# Patient Record
Sex: MALE | Race: WHITE | NOT HISPANIC OR LATINO | Employment: OTHER | URBAN - METROPOLITAN AREA
[De-identification: names, ages, dates, MRNs, and addresses within clinical notes are randomized per-mention and may not be internally consistent; named-entity substitution may affect disease eponyms.]

---

## 2017-04-08 ENCOUNTER — HOSPITAL ENCOUNTER (EMERGENCY)
Facility: MEDICAL CENTER | Age: 22
End: 2017-04-08
Attending: EMERGENCY MEDICINE
Payer: COMMERCIAL

## 2017-04-08 VITALS
TEMPERATURE: 98.9 F | DIASTOLIC BLOOD PRESSURE: 74 MMHG | SYSTOLIC BLOOD PRESSURE: 141 MMHG | WEIGHT: 145.5 LBS | RESPIRATION RATE: 18 BRPM | BODY MASS INDEX: 22.84 KG/M2 | OXYGEN SATURATION: 96 % | HEART RATE: 75 BPM | HEIGHT: 67 IN

## 2017-04-08 DIAGNOSIS — S01.81XA FACIAL LACERATION, INITIAL ENCOUNTER: ICD-10-CM

## 2017-04-08 PROCEDURE — 700101 HCHG RX REV CODE 250: Performed by: EMERGENCY MEDICINE

## 2017-04-08 PROCEDURE — 303747 HCHG EXTRA SUTURE

## 2017-04-08 PROCEDURE — 99283 EMERGENCY DEPT VISIT LOW MDM: CPT

## 2017-04-08 PROCEDURE — 304217 HCHG IRRIGATION SYSTEM

## 2017-04-08 PROCEDURE — 304999 HCHG REPAIR-SIMPLE/INTERMED LEVEL 1

## 2017-04-08 RX ORDER — LIDOCAINE HYDROCHLORIDE AND EPINEPHRINE BITARTRATE 20; .01 MG/ML; MG/ML
10 INJECTION, SOLUTION SUBCUTANEOUS ONCE
Status: COMPLETED | OUTPATIENT
Start: 2017-04-08 | End: 2017-04-08

## 2017-04-08 RX ADMIN — LIDOCAINE HYDROCHLORIDE,EPINEPHRINE BITARTRATE 10 ML: 20; .01 INJECTION, SOLUTION INFILTRATION; PERINEURAL at 20:45

## 2017-04-08 ASSESSMENT — PAIN SCALES - GENERAL
PAINLEVEL_OUTOF10: 3
PAINLEVEL_OUTOF10: 3

## 2017-04-08 NOTE — ED AVS SNAPSHOT
Home Care Instructions                                                                                                                Edward Vega   MRN: [id number]    Department:  [facility], Emergency Dept   Date of Visit:  4/8/2017            [facility], Emergency Dept    [address]    [place] [address]    Phone:  [phone]      You were seen by     Alan Hernandez M.D.      Your Diagnosis Was     Facial laceration, initial encounter     S01.81XA       These are the medications you received during your hospitalization from 04/08/2017 2004 to 04/08/2017 2121     Date/Time Order Dose Route Action    04/08/2017 2045 lidocaine-epinephrine 2 %-1:732370 injection 10 mL 10 mL Injection Given      Follow-up Information     1. Follow up with Nadine Higuera M.D..    Specialty:  Family Medicine    Contact information    [address] [facility] [address]  [place] [address] [phone]        Medication Information     Review all of your home medications and newly ordered medications with your primary doctor and/or pharmacist as soon as possible. Follow medication instructions as directed by your doctor and/or pharmacist.     Please keep your complete medication list with you and share with your physician. Update the information when medications are discontinued, doses are changed, or new medications (including over-the-counter products) are added; and carry medication information at all times in the event of emergency situations.               Medication List      Notice     You have not been prescribed any medications.              Discharge Instructions       Facial Laceration   A facial laceration is a cut on the face. These injuries can be painful and cause bleeding. Lacerations usually heal quickly, but they need special care to reduce scarring.  DIAGNOSIS   Your health care provider will take a medical history, ask for details about how the injury occurred, and  examine the wound to determine how deep the cut is.  TREATMENT   Some facial lacerations may not require closure. Others may not be able to be closed because of an increased risk of infection. The risk of infection and the chance for successful closure will depend on various factors, including the amount of time since the injury occurred.  The wound may be cleaned to help prevent infection. If closure is appropriate, pain medicines may be given if needed. Your health care provider will use stitches (sutures), wound glue (adhesive), or skin adhesive strips to repair the laceration. These tools bring the skin edges together to allow for faster healing and a better cosmetic outcome. If needed, you may also be given a tetanus shot.  HOME CARE INSTRUCTIONS  · Only take over-the-counter or prescription medicines as directed by your health care provider.  · Follow your health care provider's instructions for wound care. These instructions will vary depending on the technique used for closing the wound.  For Sutures:  · Keep the wound clean and dry.    · If you were given a bandage (dressing), you should change it at least once a day. Also change the dressing if it becomes wet or dirty, or as directed by your health care provider.    · Wash the wound with soap and water 2 times a day. Rinse the wound off with water to remove all soap. Pat the wound dry with a clean towel.    · After cleaning, apply a thin layer of the antibiotic ointment recommended by your health care provider. This will help prevent infection and keep the dressing from sticking.    · You may shower as usual after the first 24 hours. Do not soak the wound in water until the sutures are removed.    · Get your sutures removed as directed by your health care provider. With facial lacerations, sutures should usually be taken out after 4-5 days to avoid stitch marks.    · Wait a few days after your sutures are removed before applying any makeup.  For Skin  Adhesive Strips:  · Keep the wound clean and dry.    · Do not get the skin adhesive strips wet. You may bathe carefully, using caution to keep the wound dry.    · If the wound gets wet, pat it dry with a clean towel.    · Skin adhesive strips will fall off on their own. You may trim the strips as the wound heals. Do not remove skin adhesive strips that are still stuck to the wound. They will fall off in time.    For Wound Adhesive:  · You may briefly wet your wound in the shower or bath. Do not soak or scrub the wound. Do not swim. Avoid periods of heavy sweating until the skin adhesive has fallen off on its own. After showering or bathing, gently pat the wound dry with a clean towel.    · Do not apply liquid medicine, cream medicine, ointment medicine, or makeup to your wound while the skin adhesive is in place. This may loosen the film before your wound is healed.    · If a dressing is placed over the wound, be careful not to apply tape directly over the skin adhesive. This may cause the adhesive to be pulled off before the wound is healed.    · Avoid prolonged exposure to sunlight or tanning lamps while the skin adhesive is in place.  · The skin adhesive will usually remain in place for 5-10 days, then naturally fall off the skin. Do not pick at the adhesive film.    After Healing:  Once the wound has healed, cover the wound with sunscreen during the day for 1 full year. This can help minimize scarring. Exposure to ultraviolet light in the first year will darken the scar. It can take 1-2 years for the scar to lose its redness and to heal completely.   SEEK MEDICAL CARE IF:  · You have a fever.  SEEK IMMEDIATE MEDICAL CARE IF:  · You have redness, pain, or swelling around the wound.    · You see a yellowish-white fluid (pus) coming from the wound.       This information is not intended to replace advice given to you by your health care provider. Make sure you discuss any questions you have with your health care  provider.     Document Released: 01/25/2006 Document Revised: 01/08/2016 Document Reviewed: 07/31/2014  Elsevier Interactive Patient Education ©2016 Elsevier Inc.            Patient Information     Patient Information    Following emergency treatment: all patient requiring follow-up care must return either to a private physician or a clinic if your condition worsens before you are able to obtain further medical attention, please return to the emergency room.     Billing Information    At [facility], we work to make the billing process streamlined for our patients.  Our Representatives are here to answer any questions you may have regarding your hospital bill.  If you have insurance coverage and have supplied your insurance information to us, we will submit a claim to your insurer on your behalf.  Should you have any questions regarding your bill, we can be reached online or by phone as follows:  Online: You are able pay your bills online or live chat with our representatives about any billing questions you may have. We are here to help Monday - Friday from 8:00am to 7:30pm and 9:00am - 12:00pm on Saturdays.  Please visit [email or web address]  for more information.   Phone:  [phone] or [phone]    Please note that your emergency physician, surgeon, pathologist, radiologist, anesthesiologist, and other specialists are not employed by [facility] and will therefore bill separately for their services.  Please contact them directly for any questions concerning their bills at the numbers below:     Emergency Physician Services:  [phone]  [facility]:  [phone]  [facility]:  [phone]  [facility]:  [phone]    1. Your final bill may vary from the amount quoted upon discharge if all procedures are not complete at that time, or if your doctor has additional procedures of which we are not aware. You will receive an  additional bill if you return to the Emergency Department at [facility] for suture removal regardless of the facility of which the sutures were placed.     2. Please arrange for settlement of this account at the emergency registration.    3. All self-pay accounts are due in full at the time of treatment.  If you are unable to meet this obligation then payment is expected within 4-5 days.     4. If you have had radiology studies (CT, X-ray, Ultrasound, MRI), you have received a preliminary result during your emergency department visit. Please contact the radiology department [phone] to receive a copy of your final result. Please discuss the Final result with your primary physician or with the follow up physician provided.     Crisis Hotline:  [place] Crisis Hotline:  7-861-UXQXJJE or 1-853.218.4553  [place] Crisis Hotline:    1-382.885.7321 or 377-157-1424         ED Discharge Follow Up Questions    1. In order to provide you with very good care, we would like to follow up with a phone call in the next few days.  May we have your permission to contact you?     YES /  NO    2. What is the best phone number to call you? (       )_____-__________    3. What is the best time to call you?      Morning  /  Afternoon  /  Evening                   Patient Signature:  ____________________________________________________________    Date:  ____________________________________________________________

## 2017-04-08 NOTE — ED AVS SNAPSHOT
[employer] Access Code: [email or web address]  Expires: 5/8/2017  9:21 PM    [employer]  A secure, online tool to manage your health information     [employer]® is a secure, online tool that connects you to your personalized health information from the privacy of your home -- day or night - making it very easy for you to manage your healthcare. Once the activation process is completed, you can even access your medical information using the [employer] eleni, which is available for free in the Apple Eleni store or Google Play store.     [employer] provides the following levels of access (as shown below):   My Chart Features   [facility] Primary Care Doctor [facility]  Specialists [facility]  Urgent  Care Non-[facility]  Primary Care  Doctor   Email your healthcare team securely and privately 24/7 X X X X   Manage appointments: schedule your next appointment; view details of past/upcoming appointments X      Request prescription refills. X      View recent personal medical records, including lab and immunizations X X X X   View health record, including health history, allergies, medications X X X X   Read reports about your outpatient visits, procedures, consult and ER notes X X X X   See your discharge summary, which is a recap of your hospital and/or ER visit that includes your diagnosis, lab results, and care plan. X X       How to register for [employer]:  1. Go to  [email or web address].  2. Click on the Sign Up Now box, which takes you to the New Member Sign Up page. You will need to provide the following information:  a. Enter your [employer] Access Code exactly as it appears at the top of this page. (You will not need to use this code after you’ve completed the sign-up process. If you do not sign up before the expiration date, you must request a new code.)   b. Enter your date of birth.   c. Enter your home email address.   d. Click Submit, and follow the next screen’s instructions.  3. Create a [employer] ID. This will be your [employer]  login ID and cannot be changed, so think of one that is secure and easy to remember.  4. Create a [employer] password. You can change your password at any time.  5. Enter your Password Reset Question and Answer. This can be used at a later time if you forget your password.   6. Enter your e-mail address. This allows you to receive e-mail notifications when new information is available in [employer].  7. Click Sign Up. You can now view your health information.    For assistance activating your [employer] account, call [phone]

## 2017-04-08 NOTE — ED AVS SNAPSHOT
4/8/2017          Edward Vega  [address] [place] [address]    Dear Edward:    [facility] wants to ensure your discharge home is safe and you or your loved ones have had all your questions answered regarding your care after you leave the hospital.    You may receive a telephone call within two days of your discharge.  This call is to make certain you understand your discharge instructions as well as ensure we provided you with the best care possible during your stay with us.     The call will only last approximately 3-5 minutes and will be done by a nurse.    Once again, we want to ensure your discharge home is safe and that you have a clear understanding of any next steps in your care.  If you have any questions or concerns, please do not hesitate to contact us, we are here for you.  Thank you for choosing [facility] for your healthcare needs.    Sincerely,    Chuck Longo    [facility]

## 2017-04-09 NOTE — ED NOTES
"Pt assessed. Pt comes in with right sided lac above eyebrow after being hit with a ping pong paddle that was thrown because \"my friend was mad he lost\". Incident occurred about 20 minutes ago. Pt states that he \"fell to the ground and blacked out for a few seconds but I dont think I passed out because I still could hear everything.\" Pt has not had tetanus vaccine recently, per Mother, he does not get vaccinations because he has severe allergies to multiple foods/environmental. Saline soaked gauze applied to lac. Will cont to monitor.   "

## 2017-04-09 NOTE — ED PROVIDER NOTES
"ED Provider Note    CHIEF COMPLAINT  Chief Complaint   Patient presents with   • Facial Laceration       HPI  Edward Vega is a 22 y.o. male who presents with a facial laceration starting just above the right eyebrow extending down into it. This occurred just shortly before arrival he was playing ping-pong apparently another player lost his pedal and it flew forward and hit him on the eyebrow there is no additional trauma no injury to the eye itself    REVIEW OF SYSTEMS  See HPI for further details. PAST MEDICAL HISTORY  No past medical history on file.    FAMILY HISTORY  No family history on file.    SOCIAL HISTORY  Social History     Social History   • Marital Status: Single     Spouse Name: N/A   • Number of Children: N/A   • Years of Education: N/A     Social History Main Topics   • Smoking status: Never Smoker    • Smokeless tobacco: Never Used   • Alcohol Use: Not on file   • Drug Use: Not on file   • Sexual Activity: Not on file     Other Topics Concern   • Not on file     Social History Narrative   • No narrative on file       SURGICAL HISTORY  No past surgical history on file.    CURRENT MEDICATIONS  Home Medications     Reviewed by Tad Garcia R.N. (Registered Nurse) on 04/08/17 at 2014  Med List Status: Complete    Medication Last Dose Status          Patient Mason Taking any Medications                        ALLERGIES  Allergies   Allergen Reactions   • Azithromycin      Does not know what happens when he takes it   • Dairy Enzyme Formula    • Peanut-Derived        PHYSICAL EXAM  VITAL SIGNS: /74 mmHg  Pulse 90  Temp(Src) 37.2 °C (98.9 °F)  Resp 18  Ht 1.702 m (5' 7\")  Wt 66 kg (145 lb 8.1 oz)  BMI 22.78 kg/m2  SpO2 99%     Constitutional: Well developed, Well nourished, No acute distress, Non-toxic appearance.   HENT: Normocephalic, Atraumatic, Bilateral external ears normal, mucous membranes moist, No oral exudates, Nose normal. There is a Y-shaped laceration starting just above " the medial portion of the right eyebrow and extending down into it Eyes: PERRLA,  Conjunctiva and lids normal, No discharge. No apparent injury to the globe  Skin: Warm, Dry, No erythema, No rash.   Extremities: Intact distal pulses, No edema, No tenderness, No cyanosis, No clubbing.   Musculoskeletal: Good range of motion in all major joints. No tenderness to palpation or major deformities, or injuries noted.   Neurologic: Alert & oriented x 3, Normal motor function, Normal sensory function, No focal deficits noted.             COURSE & MEDICAL DECISION MAKING  Pertinent Labs & Imaging studies reviewed. (See chart for details)  Patient has a facial laceration that needs suture repair.  Procedure note: The patient laceration was anesthetized with local infiltration of lidocaine and nerve block. It is irrigated copiously with normal saline. It is approximated with interrupted sutures of 50 plain gut dictation. Patient be given instructions related to laceration.Total length of repair of laceration is 2.5 cm    FINAL IMPRESSION  1 facial laceration      Electronically signed by: Alan Hernandez, 4/8/2017 9:17 PM

## 2017-04-09 NOTE — ED NOTES
Discharge information provided. Pt verbalized understanding of discharge instructions to follow up with PCP and to return to ER if condition worsens. Pt given extensive education on wound care and s/s infection. Pt ambulated out of ER in a steady gait with parents, no additional questions or concerns.

## 2017-04-09 NOTE — DISCHARGE INSTRUCTIONS
Facial Laceration   A facial laceration is a cut on the face. These injuries can be painful and cause bleeding. Lacerations usually heal quickly, but they need special care to reduce scarring.  DIAGNOSIS   Your health care provider will take a medical history, ask for details about how the injury occurred, and examine the wound to determine how deep the cut is.  TREATMENT   Some facial lacerations may not require closure. Others may not be able to be closed because of an increased risk of infection. The risk of infection and the chance for successful closure will depend on various factors, including the amount of time since the injury occurred.  The wound may be cleaned to help prevent infection. If closure is appropriate, pain medicines may be given if needed. Your health care provider will use stitches (sutures), wound glue (adhesive), or skin adhesive strips to repair the laceration. These tools bring the skin edges together to allow for faster healing and a better cosmetic outcome. If needed, you may also be given a tetanus shot.  HOME CARE INSTRUCTIONS  · Only take over-the-counter or prescription medicines as directed by your health care provider.  · Follow your health care provider's instructions for wound care. These instructions will vary depending on the technique used for closing the wound.  For Sutures:  · Keep the wound clean and dry.    · If you were given a bandage (dressing), you should change it at least once a day. Also change the dressing if it becomes wet or dirty, or as directed by your health care provider.    · Wash the wound with soap and water 2 times a day. Rinse the wound off with water to remove all soap. Pat the wound dry with a clean towel.    · After cleaning, apply a thin layer of the antibiotic ointment recommended by your health care provider. This will help prevent infection and keep the dressing from sticking.    · You may shower as usual after the first 24 hours. Do not soak the  wound in water until the sutures are removed.    · Get your sutures removed as directed by your health care provider. With facial lacerations, sutures should usually be taken out after 4-5 days to avoid stitch marks.    · Wait a few days after your sutures are removed before applying any makeup.  For Skin Adhesive Strips:  · Keep the wound clean and dry.    · Do not get the skin adhesive strips wet. You may bathe carefully, using caution to keep the wound dry.    · If the wound gets wet, pat it dry with a clean towel.    · Skin adhesive strips will fall off on their own. You may trim the strips as the wound heals. Do not remove skin adhesive strips that are still stuck to the wound. They will fall off in time.    For Wound Adhesive:  · You may briefly wet your wound in the shower or bath. Do not soak or scrub the wound. Do not swim. Avoid periods of heavy sweating until the skin adhesive has fallen off on its own. After showering or bathing, gently pat the wound dry with a clean towel.    · Do not apply liquid medicine, cream medicine, ointment medicine, or makeup to your wound while the skin adhesive is in place. This may loosen the film before your wound is healed.    · If a dressing is placed over the wound, be careful not to apply tape directly over the skin adhesive. This may cause the adhesive to be pulled off before the wound is healed.    · Avoid prolonged exposure to sunlight or tanning lamps while the skin adhesive is in place.  · The skin adhesive will usually remain in place for 5-10 days, then naturally fall off the skin. Do not pick at the adhesive film.    After Healing:  Once the wound has healed, cover the wound with sunscreen during the day for 1 full year. This can help minimize scarring. Exposure to ultraviolet light in the first year will darken the scar. It can take 1-2 years for the scar to lose its redness and to heal completely.   SEEK MEDICAL CARE IF:  · You have a fever.  SEEK IMMEDIATE  MEDICAL CARE IF:  · You have redness, pain, or swelling around the wound.    · You see a yellowish-white fluid (pus) coming from the wound.       This information is not intended to replace advice given to you by your health care provider. Make sure you discuss any questions you have with your health care provider.     Document Released: 01/25/2006 Document Revised: 01/08/2016 Document Reviewed: 07/31/2014  [employer] Interactive Patient Education ©2016 [employer] Inc.

## 2017-04-20 NOTE — DOCUMENTATION QUERY
DOCUMENTATION QUERY    PROVIDERS: Please select “Cosign w/ note”to reply to query.    To better represent the severity of illness of your patient, please review the following information and exercise your independent professional judgment in responding to this query.     Laceration length is not documented in the ED note. Based upon the clinical findings, risk factors, and treatment, could this please be added.                The medical record reflects the following:   Clinical Findings Missing Size of Laceration   Treatment Sutured   Risk Factors None   Location within medical record  ED Physician Note- Procedure     Thank you,   Lynda

## 2019-05-17 ENCOUNTER — OFFICE VISIT (OUTPATIENT)
Dept: URGENT CARE | Facility: CLINIC | Age: 24
End: 2019-05-17
Payer: COMMERCIAL

## 2019-05-17 VITALS
BODY MASS INDEX: 20 KG/M2 | HEIGHT: 68 IN | OXYGEN SATURATION: 96 % | SYSTOLIC BLOOD PRESSURE: 108 MMHG | WEIGHT: 132 LBS | RESPIRATION RATE: 16 BRPM | TEMPERATURE: 100.5 F | HEART RATE: 91 BPM | DIASTOLIC BLOOD PRESSURE: 68 MMHG

## 2019-05-17 DIAGNOSIS — R50.9 FEVER, UNSPECIFIED FEVER CAUSE: ICD-10-CM

## 2019-05-17 LAB
APPEARANCE UR: CLEAR
BILIRUB UR STRIP-MCNC: NORMAL MG/DL
COLOR UR AUTO: NORMAL
GLUCOSE UR STRIP.AUTO-MCNC: NORMAL MG/DL
HETEROPH AB SER QL LA: NEGATIVE
INT CON NEG: NEGATIVE
INT CON NEG: NEGATIVE
INT CON POS: POSITIVE
INT CON POS: POSITIVE
KETONES UR STRIP.AUTO-MCNC: 80 MG/DL
LEUKOCYTE ESTERASE UR QL STRIP.AUTO: NORMAL
NITRITE UR QL STRIP.AUTO: NORMAL
PH UR STRIP.AUTO: 7 [PH] (ref 5–8)
PROT UR QL STRIP: 100 MG/DL
RBC UR QL AUTO: NORMAL
S PYO AG THROAT QL: NEGATIVE
SP GR UR STRIP.AUTO: 1.01
UROBILINOGEN UR STRIP-MCNC: 1 MG/DL

## 2019-05-17 PROCEDURE — 87880 STREP A ASSAY W/OPTIC: CPT | Performed by: FAMILY MEDICINE

## 2019-05-17 PROCEDURE — 86308 HETEROPHILE ANTIBODY SCREEN: CPT | Performed by: FAMILY MEDICINE

## 2019-05-17 PROCEDURE — 99203 OFFICE O/P NEW LOW 30 MIN: CPT | Performed by: FAMILY MEDICINE

## 2019-05-17 PROCEDURE — 81002 URINALYSIS NONAUTO W/O SCOPE: CPT | Performed by: FAMILY MEDICINE

## 2019-05-17 RX ORDER — ACETAMINOPHEN 500 MG
1000 TABLET ORAL ONCE
Status: COMPLETED | OUTPATIENT
Start: 2019-05-17 | End: 2019-05-17

## 2019-05-17 RX ADMIN — Medication 1000 MG: at 11:30

## 2019-05-17 ASSESSMENT — ENCOUNTER SYMPTOMS
HEADACHES: 1
RESPIRATORY NEGATIVE: 1
EYES NEGATIVE: 1
CHILLS: 1
PSYCHIATRIC NEGATIVE: 1
FEVER: 1
GASTROINTESTINAL NEGATIVE: 1
CARDIOVASCULAR NEGATIVE: 1

## 2019-05-17 ASSESSMENT — PAIN SCALES - GENERAL: PAINLEVEL: NO PAIN

## 2019-05-17 NOTE — PROGRESS NOTES
"Subjective:      Edward Vega is a 24 y.o. male who presents with Body Aches (x4 days); Fever; and Chills (\"hot and cold flashes\")            This is a new problem.  24-year-old presenting with 4-day history of fever and chills.  He denies any cough, congestion, sore throat, abdominal pain, urinary symptoms.  He wakes up in the morning with a really bad headache he reports.  He currently does not have any headache and denies any change in vision or paresthesia or muscle weakness.  No travel history or exposure to other ill contacts.  No exposure to pneumonia.  He denies having any unusual rash of any recent tick bite.        Review of Systems   Constitutional: Positive for chills and fever.   HENT: Negative.    Eyes: Negative.    Respiratory: Negative.    Cardiovascular: Negative.    Gastrointestinal: Negative.    Genitourinary: Negative.    Neurological: Positive for headaches.   Psychiatric/Behavioral: Negative.    All other systems reviewed and are negative.         Objective:     /68   Pulse 95   Temp (!) 38.1 °C (100.5 °F) (Temporal)   Resp 16   Ht 1.715 m (5' 7.5\")   Wt 59.9 kg (132 lb)   SpO2 93%   BMI 20.37 kg/m²      Physical Exam   Constitutional: He is oriented to person, place, and time. He appears well-developed and well-nourished.  Non-toxic appearance. No distress.   HENT:   Head: Normocephalic and atraumatic.   Right Ear: Tympanic membrane, external ear and ear canal normal.   Left Ear: Tympanic membrane, external ear and ear canal normal.   Nose: Nose normal. No rhinorrhea.   Mouth/Throat: Uvula is midline and oropharynx is clear and moist. No oral lesions. No trismus in the jaw. No dental abscesses or uvula swelling. No oropharyngeal exudate, posterior oropharyngeal edema, posterior oropharyngeal erythema or tonsillar abscesses. No tonsillar exudate.   Eyes: Pupils are equal, round, and reactive to light. Conjunctivae, EOM and lids are normal. No scleral icterus.   Neck: Neck " supple. No Brudzinski's sign and no Kernig's sign noted.   Cardiovascular: Normal rate and regular rhythm.  Exam reveals no gallop and no friction rub.    No murmur heard.  Pulmonary/Chest: Effort normal. No stridor. No respiratory distress. He has no wheezes. He has no rales.   Abdominal: Soft. He exhibits no distension and no mass. There is no tenderness. There is no rebound and no guarding. No hernia.   Lymphadenopathy:     He has no cervical adenopathy.   Neurological: He is alert and oriented to person, place, and time. He has normal strength. No cranial nerve deficit or sensory deficit. Coordination normal.   Skin: Skin is warm. No rash noted. He is not diaphoretic. No erythema. No pallor.   Psychiatric: He has a normal mood and affect.     Results for orders placed or performed in visit on 05/17/19   POCT Mononucleosis (mono)   Result Value Ref Range    Heterophile Screen Negative     Internal Control Positive Positive     Internal Control Negative Negative    POCT Rapid Strep A   Result Value Ref Range    Rapid Strep Screen NEGATIVE     Internal Control Positive Positive     Internal Control Negative Negative    POCT Urinalysis   Result Value Ref Range    POC Color Linnea Negative    POC Appearance Clear Negative    POC Leukocyte Esterase Neg Negative    POC Nitrites Neg Negative    POC Urobiligen 1.0 Negative (0.2) mg/dL    POC Protein 100 Negative mg/dL    POC Urine PH 7.0 5.0 - 8.0    POC Blood Neg Negative    POC Specific Gravity 1.015 <1.005 - >1.030    POC Ketones 80 Negative mg/dL    POC Bilirubin Small Negative mg/dL    POC Glucose Neg Negative mg/dL               Assessment/Plan:   ASSESSMENT:PLAN:  1. Fever, unspecified fever cause  - POCT Mononucleosis (mono)  - POCT Rapid Strep A  - POCT Urinalysis  - acetaminophen (TYLENOL) tablet 1,000 mg; Take 2 Tabs by mouth Once.      Fever of unknown origin, negative strep and mono here.  Not convinced he has a urinary tract infection  He does not have any  other risk factors.  Reports headache in the morning although currently no headache and normal neuro exam   Recommend further evaluation in the ED setting.

## 2021-04-26 ENCOUNTER — HOSPITAL ENCOUNTER (OUTPATIENT)
Dept: RADIOLOGY | Facility: MEDICAL CENTER | Age: 26
End: 2021-04-26
Attending: CHIROPRACTOR
Payer: COMMERCIAL

## 2021-04-26 DIAGNOSIS — M99.01 CERVICAL SEGMENT DYSFUNCTION: ICD-10-CM

## 2021-04-26 DIAGNOSIS — M99.04 SOMATIC DYSFUNCTION OF SACRAL REGION: ICD-10-CM

## 2021-04-26 DIAGNOSIS — M99.02 THORACIC SEGMENT DYSFUNCTION: ICD-10-CM

## 2021-04-26 DIAGNOSIS — M99.03 SOMATIC DYSFUNCTION OF LUMBAR REGION: ICD-10-CM

## 2021-04-26 PROCEDURE — 72072 X-RAY EXAM THORAC SPINE 3VWS: CPT

## 2021-04-26 PROCEDURE — 72100 X-RAY EXAM L-S SPINE 2/3 VWS: CPT

## 2021-04-26 PROCEDURE — 72050 X-RAY EXAM NECK SPINE 4/5VWS: CPT

## 2021-04-26 PROCEDURE — 72170 X-RAY EXAM OF PELVIS: CPT
